# Patient Record
Sex: FEMALE | Race: WHITE | Employment: OTHER | URBAN - METROPOLITAN AREA
[De-identification: names, ages, dates, MRNs, and addresses within clinical notes are randomized per-mention and may not be internally consistent; named-entity substitution may affect disease eponyms.]

---

## 2017-06-20 ENCOUNTER — OFFICE VISIT (OUTPATIENT)
Dept: ORTHOPEDIC SURGERY | Age: 72
End: 2017-06-20

## 2017-06-20 ENCOUNTER — HOSPITAL ENCOUNTER (OUTPATIENT)
Dept: PHYSICAL THERAPY | Age: 72
Discharge: HOME | End: 2017-06-30
Admitting: ORTHOPAEDIC SURGERY

## 2017-06-20 VITALS
WEIGHT: 145 LBS | HEIGHT: 64 IN | DIASTOLIC BLOOD PRESSURE: 82 MMHG | SYSTOLIC BLOOD PRESSURE: 126 MMHG | BODY MASS INDEX: 24.75 KG/M2

## 2017-06-20 DIAGNOSIS — M25.562 ACUTE PAIN OF LEFT KNEE: Primary | ICD-10-CM

## 2017-06-20 DIAGNOSIS — M17.10 PATELLOFEMORAL ARTHRITIS: Chronic | ICD-10-CM

## 2017-06-20 PROCEDURE — 3014F SCREEN MAMMO DOC REV: CPT | Performed by: ORTHOPAEDIC SURGERY

## 2017-06-20 PROCEDURE — 1090F PRES/ABSN URINE INCON ASSESS: CPT | Performed by: ORTHOPAEDIC SURGERY

## 2017-06-20 PROCEDURE — 1123F ACP DISCUSS/DSCN MKR DOCD: CPT | Performed by: ORTHOPAEDIC SURGERY

## 2017-06-20 PROCEDURE — 4040F PNEUMOC VAC/ADMIN/RCVD: CPT | Performed by: ORTHOPAEDIC SURGERY

## 2017-06-20 PROCEDURE — 20610 DRAIN/INJ JOINT/BURSA W/O US: CPT | Performed by: ORTHOPAEDIC SURGERY

## 2017-06-20 PROCEDURE — G8420 CALC BMI NORM PARAMETERS: HCPCS | Performed by: ORTHOPAEDIC SURGERY

## 2017-06-20 PROCEDURE — 73564 X-RAY EXAM KNEE 4 OR MORE: CPT | Performed by: ORTHOPAEDIC SURGERY

## 2017-06-20 PROCEDURE — 3017F COLORECTAL CA SCREEN DOC REV: CPT | Performed by: ORTHOPAEDIC SURGERY

## 2017-06-20 PROCEDURE — G8427 DOCREV CUR MEDS BY ELIG CLIN: HCPCS | Performed by: ORTHOPAEDIC SURGERY

## 2017-06-20 PROCEDURE — 99214 OFFICE O/P EST MOD 30 MIN: CPT | Performed by: ORTHOPAEDIC SURGERY

## 2017-06-20 PROCEDURE — G8400 PT W/DXA NO RESULTS DOC: HCPCS | Performed by: ORTHOPAEDIC SURGERY

## 2017-06-20 PROCEDURE — 1036F TOBACCO NON-USER: CPT | Performed by: ORTHOPAEDIC SURGERY

## 2017-06-20 RX ORDER — MELOXICAM 15 MG/1
15 TABLET ORAL DAILY
Qty: 60 TABLET | Refills: 3 | Status: SHIPPED | OUTPATIENT
Start: 2017-06-20

## 2017-08-08 ENCOUNTER — OFFICE VISIT (OUTPATIENT)
Dept: ORTHOPEDIC SURGERY | Age: 72
End: 2017-08-08

## 2017-08-08 VITALS — WEIGHT: 145.06 LBS | HEIGHT: 64 IN | BODY MASS INDEX: 24.77 KG/M2

## 2017-08-08 DIAGNOSIS — M17.10 PATELLOFEMORAL ARTHRITIS: Primary | Chronic | ICD-10-CM

## 2017-08-08 PROCEDURE — 1090F PRES/ABSN URINE INCON ASSESS: CPT | Performed by: ORTHOPAEDIC SURGERY

## 2017-08-08 PROCEDURE — 1123F ACP DISCUSS/DSCN MKR DOCD: CPT | Performed by: ORTHOPAEDIC SURGERY

## 2017-08-08 PROCEDURE — G8420 CALC BMI NORM PARAMETERS: HCPCS | Performed by: ORTHOPAEDIC SURGERY

## 2017-08-08 PROCEDURE — G8400 PT W/DXA NO RESULTS DOC: HCPCS | Performed by: ORTHOPAEDIC SURGERY

## 2017-08-08 PROCEDURE — 99212 OFFICE O/P EST SF 10 MIN: CPT | Performed by: ORTHOPAEDIC SURGERY

## 2017-08-08 PROCEDURE — 4040F PNEUMOC VAC/ADMIN/RCVD: CPT | Performed by: ORTHOPAEDIC SURGERY

## 2017-08-08 PROCEDURE — 3017F COLORECTAL CA SCREEN DOC REV: CPT | Performed by: ORTHOPAEDIC SURGERY

## 2017-08-08 PROCEDURE — 1036F TOBACCO NON-USER: CPT | Performed by: ORTHOPAEDIC SURGERY

## 2017-08-08 PROCEDURE — 3014F SCREEN MAMMO DOC REV: CPT | Performed by: ORTHOPAEDIC SURGERY

## 2017-08-08 PROCEDURE — G8427 DOCREV CUR MEDS BY ELIG CLIN: HCPCS | Performed by: ORTHOPAEDIC SURGERY

## 2018-08-27 ENCOUNTER — OFFICE VISIT (OUTPATIENT)
Dept: ORTHOPEDIC SURGERY | Age: 73
End: 2018-08-27

## 2018-08-27 VITALS — HEIGHT: 64 IN | BODY MASS INDEX: 24.75 KG/M2 | WEIGHT: 145 LBS

## 2018-08-27 DIAGNOSIS — R52 PAIN: Primary | ICD-10-CM

## 2018-08-27 DIAGNOSIS — M17.10 PATELLOFEMORAL ARTHRITIS: Chronic | ICD-10-CM

## 2018-08-27 DIAGNOSIS — M25.562 CHRONIC PAIN OF LEFT KNEE: Chronic | ICD-10-CM

## 2018-08-27 DIAGNOSIS — G89.29 CHRONIC PAIN OF LEFT KNEE: Chronic | ICD-10-CM

## 2018-08-27 DIAGNOSIS — M25.561 RIGHT KNEE PAIN, UNSPECIFIED CHRONICITY: ICD-10-CM

## 2018-08-27 DIAGNOSIS — S83.241A ACUTE MEDIAL MENISCUS TEAR OF RIGHT KNEE, INITIAL ENCOUNTER: ICD-10-CM

## 2018-08-27 PROCEDURE — 20610 DRAIN/INJ JOINT/BURSA W/O US: CPT | Performed by: ORTHOPAEDIC SURGERY

## 2018-08-27 PROCEDURE — 1101F PT FALLS ASSESS-DOCD LE1/YR: CPT | Performed by: ORTHOPAEDIC SURGERY

## 2018-08-27 PROCEDURE — G8400 PT W/DXA NO RESULTS DOC: HCPCS | Performed by: ORTHOPAEDIC SURGERY

## 2018-08-27 PROCEDURE — 1090F PRES/ABSN URINE INCON ASSESS: CPT | Performed by: ORTHOPAEDIC SURGERY

## 2018-08-27 PROCEDURE — 4040F PNEUMOC VAC/ADMIN/RCVD: CPT | Performed by: ORTHOPAEDIC SURGERY

## 2018-08-27 PROCEDURE — 99213 OFFICE O/P EST LOW 20 MIN: CPT | Performed by: ORTHOPAEDIC SURGERY

## 2018-08-27 PROCEDURE — 1123F ACP DISCUSS/DSCN MKR DOCD: CPT | Performed by: ORTHOPAEDIC SURGERY

## 2018-08-27 PROCEDURE — 1036F TOBACCO NON-USER: CPT | Performed by: ORTHOPAEDIC SURGERY

## 2018-08-27 PROCEDURE — G8428 CUR MEDS NOT DOCUMENT: HCPCS | Performed by: ORTHOPAEDIC SURGERY

## 2018-08-27 PROCEDURE — 3017F COLORECTAL CA SCREEN DOC REV: CPT | Performed by: ORTHOPAEDIC SURGERY

## 2018-08-27 PROCEDURE — G8420 CALC BMI NORM PARAMETERS: HCPCS | Performed by: ORTHOPAEDIC SURGERY

## 2018-08-27 RX ORDER — MELOXICAM 15 MG/1
15 TABLET ORAL DAILY
Qty: 30 TABLET | Refills: 3 | Status: SHIPPED | OUTPATIENT
Start: 2018-08-27

## 2018-08-27 NOTE — PROGRESS NOTES
Site: RIGHT KNEE    Betamethazone  Lot number: [id number]  NDC#  7333-1466-52([facility])    GENE  NDC# 6240-9915-74  Lot number: [id number]

## 2018-08-27 NOTE — PROGRESS NOTES
Chief Complaint    Knee Pain (R KNEE PAIN - NKI )      History of Present Illness:  Annabelle Magaña is a 68 y.o. female. She has been having anterior and medial right knee pain for the last several months. There was no specific history of trauma. The pain sometimes can be sore and achy but also can occasionally be sharp and catching. Walking and stairs and standing increases her pain. The pain is dull and aching but as I said before sometimes catching as well. Twisting the knee aggravates the knee as well. She's been having difficulty bending her knee. She states his been difficult to drive. He describes instability as well. Pain is anterior and medial.  Pain level is 6/10. Rest and ice decreases the pain. Pain Assessment  Location of Pain: Knee  Location Modifiers: Right  Severity of Pain: 6  Quality of Pain: Dull, Aching  Duration of Pain: Persistent  Frequency of Pain: Constant  Aggravating Factors: Walking, Stairs, Standing  Limiting Behavior: Some  Relieving Factors: Ice  Result of Injury: No  Work-Related Injury: No  Are there other pain locations you wish to document?: No    Medical History:  Patient's medications, allergies, past medical, surgical, social and family histories were reviewed and updated as appropriate. Review of Systems:  Pertinent items are noted in HPI  Review of systems reviewed from Patient History Form dated on August 27, 2018 and available in the patient's chart under the Media tab. Vital Signs:  Ht 5' 4\" (1.626 m)   Wt 145 lb (65.8 kg)   BMI 24.89 kg/m²     General Exam:   Constitutional: Patient is adequately groomed with no evidence of malnutrition  Mental Status: The patient is oriented to time, place and person. The patient's mood and affect are appropriate. Lymphatic: The lymphatic examination bilaterally reveals all areas to be without enlargement or induration.     Knee Examination:    Inspection:  Trace effusion right knee    Palpation:  Grade 2+

## 2018-08-29 ENCOUNTER — HOSPITAL ENCOUNTER (OUTPATIENT)
Dept: PHYSICAL THERAPY | Age: 73
Setting detail: THERAPIES SERIES
Discharge: HOME | End: 2018-08-29
Payer: MEDICARE

## 2018-08-29 PROCEDURE — 97161 PT EVAL LOW COMPLEX 20 MIN: CPT

## 2018-08-29 PROCEDURE — 97110 THERAPEUTIC EXERCISES: CPT

## 2018-08-29 PROCEDURE — G8978 MOBILITY CURRENT STATUS: HCPCS

## 2018-08-29 PROCEDURE — G8979 MOBILITY GOAL STATUS: HCPCS

## 2018-08-29 NOTE — FLOWSHEET NOTE
[facility],  [address]  Phone: [phone]  Fax [phone]    Physical Therapy Daily Treatment Note  Date:  2018    Patient Name:  Edwin Champagne    :  [birth year]  MRN: [id number]  Restrictions/Precautions:    Medical/Treatment Diagnosis Information:  Diagnosis: M25.561 (ICD-10-CM) - Right knee pain, unspecified chronicity  Treatment Diagnosis: R knee pain X87.530,   Insurance/Certification information:  PT Insurance Information:  W [employer] Gibson  Physician Information:  Referring Practitioner: Dr Charley Miranda of care signed (Y/N):     Date of Patient follow up with Physician:  18    G-Code (if applicable):      Date G-Code Applied:    PT G-Codes  Functional Assessment Tool Used: LEFS  Score: 28%  Functional Limitation: Mobility: Walking and moving around  Mobility: Walking and Moving Around Current Status (): At least 20 percent but less than 40 percent impaired, limited or restricted  Mobility: Walking and Moving Around Goal Status ():  At least 1 percent but less than 20 percent impaired, limited or restricted    Progress Note: [x]  Yes  []  No  Next due by: Visit #10       Latex Allergy:  [x]NO      []YES  Preferred Language for Healthcare:   [x]English       []other:    Visit # Insurance Allowable   1 MC     Pain level: 4-7/10     SUBJECTIVE:  See eval    OBJECTIVE: See eval  Observation:   Test measurements:      RESTRICTIONS/PRECAUTIONS: Knee PF OA, Osteopenia     Exercises/Interventions:     Therapeutic Ex Sets/reps Notes   ITB S Supine 30 sec x 3 HEP   Prone quad S with strap 30 sec x 3 HEP   Abd SLR 3 x 10 HEP             SLR FLX supine 3x10 HEP   Functional Chair Squats 3 x 10 HEP                                                     Manual Intervention                                   NMR re-education                                                      Therapeutic Exercise and NMR EXR  [x] by pain  [] Patient limited by other medical complications  [] Other:     Prognosis: [x] Good [] Fair  [] Poor          Patient Requires Follow-up: [] Yes  [] No    PLAN: Follow up 1x per wk to progress and update HEP       [] Continue per plan of care [] Alter current plan (see comments)  [x] Plan of care initiated [] Discharge     Electronically signed by: Cami Sharma, PT

## 2018-08-29 NOTE — PLAN OF CARE
being addressed at this time. Co-morbidities/Complexities (which will affect course of rehabilitation):   []None           Arthritic conditions   []Rheumatoid arthritis (M05.9)  [x]Osteoarthritis (M19.91)   Cardiovascular conditions   []Hypertension (I10)  []Hyperlipidemia (E78.5)  []Angina pectoris (I20)  []Atherosclerosis (I70)   Musculoskeletal conditions   []Disc pathology   []Congenital spine pathologies   []Prior surgical intervention  []Osteoporosis (M81.8)  [x]Osteopenia (M85.8)   Endocrine conditions   []Hypothyroid (E03.9)  []Hyperthyroid Gastrointestinal conditions   []Constipation (B32.68)   Metabolic conditions   []Morbid obesity (E66.01)  []Diabetes type 1(E10.65) or 2 (E11.65)   []Neuropathy (G60.9)     Pulmonary conditions   []Asthma (J45)  []Coughing   []COPD (J44.9)   Psychological Disorders  []Anxiety (F41.9)  []Depression (F32.9)   []Other:   []Other:          Barriers to/and or personal factors that will affect rehab potential:              []Age  []Sex              []Motivation/Lack of Motivation                        [x]Co-Morbidities              []Cognitive Function, education/learning barriers              []Environmental, home barriers              []profession/work barriers  []past PT/medical experience  []other:  Justification:     Falls Risk Assessment (30 days):   [x] Falls Risk assessed and no intervention required. [] Falls Risk assessed and Patient requires intervention due to being higher risk   TUG score (>12s at risk):     [] Falls education provided, including       G-Codes:  PT G-Codes  Functional Assessment Tool Used: LEFS  Score: 28%  Functional Limitation: Mobility: Walking and moving around  Mobility: Walking and Moving Around Current Status (): At least 20 percent but less than 40 percent impaired, limited or restricted  Mobility: Walking and Moving Around Goal Status ():  At least 1 percent but less than 20 percent impaired, limited or Potential:      [x]Excellent   []Good    []Fair   []Poor    Tolerance of evaluation/treatment:    [x]Excellent   []Good    []Fair   []Poor  Physical Therapy Evaluation Complexity Justification  [x] A history of present problem with:  [] no personal factors and/or comorbidities that impact the plan of care;  [x]1-2 personal factors and/or comorbidities that impact the plan of care  []3 personal factors and/or comorbidities that impact the plan of care  [x] An examination of body systems using standardized tests and measures addressing any of the following: body structures and functions (impairments), activity limitations, and/or participation restrictions;:  [x] a total of 1-2 or more elements   [] a total of 3 or more elements   [] a total of 4 or more elements   [x] A clinical presentation with:  [x] stable and/or uncomplicated characteristics   [] evolving clinical presentation with changing characteristics  [] unstable and unpredictable characteristics;   [x] Clinical decision making of [x] low, [] moderate, [] high complexity using standardized patient assessment instrument and/or measurable assessment of functional outcome. [x] EVAL (LOW) 35505 (typically 20 minutes face-to-face)  [] EVAL (MOD) 03577 (typically 30 minutes face-to-face)  [] EVAL (HIGH) 20099 (typically 45 minutes face-to-face)  [] RE-EVAL       PLAN:   Frequency/Duration:  1 days per week for 4 Weeks:  Interventions:  [x]  Therapeutic exercise including: strength training, ROM, for Lower extremity and core   [x]  NMR activation and proprioception for LE, Glutes and Core   [x]  Manual therapy as indicated for LE, Hip and spine to include: Dry Needling/IASTM, STM, PROM, Gr I-IV mobilizations, manipulation. [x] Modalities as needed that may include: thermal agents, E-stim, Biofeedback, US, iontophoresis as indicated  [x] Patient education on joint protection, postural re-education, activity modification, progression of HEP.     HEP instruction:

## 2018-09-05 ENCOUNTER — HOSPITAL ENCOUNTER (OUTPATIENT)
Dept: PHYSICAL THERAPY | Age: 73
Setting detail: THERAPIES SERIES
Discharge: HOME | End: 2018-09-05
Payer: MEDICARE

## 2018-09-05 PROCEDURE — 97140 MANUAL THERAPY 1/> REGIONS: CPT | Performed by: PHYSICAL THERAPIST

## 2018-09-05 PROCEDURE — 97110 THERAPEUTIC EXERCISES: CPT | Performed by: PHYSICAL THERAPIST

## 2018-09-05 NOTE — FLOWSHEET NOTE
[facility],  [address]  Phone: [phone]  Fax [phone]    Physical Therapy Daily Treatment Note  Date:  2018    Patient Name:  Kelsey Kelley    :  1945  MRN: [id number]  Restrictions/Precautions:    Medical/Treatment Diagnosis Information:  Diagnosis: M25.561 (ICD-10-CM) - Right knee pain, unspecified chronicity  Treatment Diagnosis: R knee pain N79.922,   Insurance/Certification information:  PT Insurance Information: [facility]  Physician Information:  Referring Practitioner: Dr Ariane Coleman of care signed (Y/N):     Date of Patient follow up with Physician:  18    G-Code (if applicable):      Date G-Code Applied:    PT G-Codes  Functional Assessment Tool Used: LEFS  Score: 28%  Functional Limitation: Mobility: Walking and moving around  Mobility: Walking and Moving Around Current Status (): At least 20 percent but less than 40 percent impaired, limited or restricted  Mobility: Walking and Moving Around Goal Status (): At least 1 percent but less than 20 percent impaired, limited or restricted    Progress Note: []  Yes  []  No  Next due by: Visit #10       Latex Allergy:  [x]NO      []YES  Preferred Language for Healthcare:   [x]English       []other:    Visit # Insurance Allowable   2 MC     Pain level: 0-1/10     SUBJECTIVE:  No new complaints from last session, states she had a good holiday weekend socializing with friends. Only complaint in \"tightness\" and swelling in/posterior to the knee. Cortisone injection has helped a lot.     OBJECTIVE:   Observation:   Test measurements:      RESTRICTIONS/PRECAUTIONS: Knee PF OA, Osteopenia     Exercises/Interventions:     Therapeutic Ex Sets/reps Notes   ITB S Supine HEP   Prone quad S with strap HEP   Abd SLR W/ 2# 3x10 HEP   SAQ with add squeeze W/ 2# 3x10    SLR FLX supine   W/ 2# 3x10    HEP   Abdominal bracing  Bridging with Adductor squeeze and abdominal bracing 10\"x10  x10 w/ 2\" hold HEP  HEP                  Functional Chair Squats  HEP   Slantboard gastroc stretch 30\"x2 HEP                                           Pt edu: activity restrictions/modifications, anatomy, pathology, reviewed/added onto HEP 8'    Manual Intervention     hamstring stretch with hold/relax, IT band stretch, quad stretch, patellar mobs 4-way, knee flexion mob gr III with oscillations, STM hamstrings/gastroc/anterior tibialis 15'                             NMR re-education                                                      Therapeutic Exercise and NMR EXR  [x] (34958) Provided verbal/tactile cueing for activities related to strengthening, flexibility, endurance, ROM for improvements in LE, proximal hip, and core control with self care, mobility, lifting, ambulation.  [] (03767) Provided verbal/tactile cueing for activities related to improving balance, coordination, kinesthetic sense, posture, motor skill, proprioception  to assist with LE, proximal hip, and core control in self care, mobility, lifting, ambulation and eccentric single leg control.      NMR and Therapeutic Activities:    [] (27651 or 96559) Provided verbal/tactile cueing for activities related to improving balance, coordination, kinesthetic sense, posture, motor skill, proprioception and motor activation to allow for proper function of core, proximal hip and LE with self care and ADLs  [] (54008) Gait Re-education- Provided training and instruction to the patient for proper LE, core and proximal hip recruitment and positioning and eccentric body weight control with ambulation re-education including up and down stairs     Home Exercise Program:    [x] (91483) Reviewed/Progressed HEP activities related to strengthening, flexibility, endurance, ROM of core, proximal hip and LE for functional self-care, mobility, lifting and ambulation/stair navigation   [] (41869)Reviewed/Progressed HEP activities related to improving balance, coordination, kinesthetic sense, posture, motor skill, proprioception of core, proximal hip and LE for self care, mobility, lifting, and ambulation/stair navigation      Manual Treatments:  PROM / STM / Oscillations-Mobs:  G-I, II, III, IV (PA's, Inf., Post.)  [x] (69573) Provided manual therapy to mobilize LE, proximal hip and/or LS spine soft tissue/joints for the purpose of modulating pain, promoting relaxation,  increasing ROM, reducing/eliminating soft tissue swelling/inflammation/restriction, improving soft tissue extensibility and allowing for proper ROM for normal function with self care, mobility, lifting and ambulation. Modalities:   Pt declined CP 9/5/18    Charges:  Timed Code Treatment Minutes: 45   Total Treatment Minutes: 45     [] EVAL (LOW) 29332 (typically 20 minutes face-to-face)  [] EVAL (MOD) 52636 (typically 30 minutes face-to-face)  [] EVAL (HIGH) 65469 (typically 45 minutes face-to-face)  [] RE-EVAL     [x] JG(95681) x  2   [] IONTO  [] NMR (40363) x      [] VASO  [x] Manual (48983) x  1    [] Other:  [] TA x       [] Mech Traction (45823)  [] ES(attended) (59534)      [] ES (un) (41646): Anthony Sigala stated goal: walking and climbing stairs painfree    Therapist goals for Patient:   Short Term Goals: To be achieved in: 2 weeks  1. Independent in HEP and progression per patient tolerance, in order to prevent re-injury. 2. Patient will have a decrease in pain to facilitate improvement in movement, function, and ADLs as indicated by Functional Deficits. Long Term Goals: To be achieved in: 4 weeks  1. Disability index score of 14% or less for the LEFS to assist with reaching prior level of function. 2. Patient will demonstrate an increase in Strength to good proximal hip strength and control, within 5lb HHD in LE to allow for proper functional mobility as indicated by patients Functional Deficits.    3. Patient will return to walking, sleeping, transfers, self care, driving and stairs functional activities without increased symptoms or restriction. 4 Walking and climbing stairs painfree (patient specific functional goal)     Progression Towards Functional goals:  [x] Patient is progressing as expected towards functional goals listed. [] Progression is slowed due to complexities listed. [] Progression has been slowed due to co-morbidities. [] Plan just implemented, too soon to assess goals progression  [] Other:     ASSESSMENT:  Added SAQ with 2# weight, added weight to HEP SLRs without incr pain. Pt expressed mild fatigue following SLR in abduction. Performed gr III mob to incr knee flexion, pt reported decr pain with deep squatting following mob. Pt active with yoga and pilates, highly motivated to return to pain-free recreational activities. Making good progress, continue to incr resistance exercises as tolerated. Pt most limited by mild swelling on the posterior-medial aspect of the knee. Treatment/Activity Tolerance:  [x] Patient tolerated treatment well [] Patient limited by fatique  [] Patient limited by pain  [] Patient limited by other medical complications  [] Other:     Prognosis: [x] Good [] Fair  [] Poor          Patient Requires Follow-up: [] Yes  [] No    PLAN: Follow up 1x per wk to progress and update HEP       [x] Continue per plan of care [] Alter current plan (see comments)  [] Plan of care initiated [] Discharge     Electronically signed by: Ramses Verduzco, PT    Richard Collazo, [facility] Therapist was present, directed the patient's care, made skilled judgement, and was responsible for assessment and treatment of the patient.

## 2018-09-12 ENCOUNTER — HOSPITAL ENCOUNTER (OUTPATIENT)
Dept: PHYSICAL THERAPY | Age: 73
Setting detail: THERAPIES SERIES
Discharge: HOME | End: 2018-09-12
Payer: MEDICARE

## 2018-09-12 PROCEDURE — 97140 MANUAL THERAPY 1/> REGIONS: CPT

## 2018-09-12 PROCEDURE — 97110 THERAPEUTIC EXERCISES: CPT

## 2018-09-12 NOTE — FLOWSHEET NOTE
navigation   [] (61273)Reviewed/Progressed HEP activities related to improving balance, coordination, kinesthetic sense, posture, motor skill, proprioception of core, proximal hip and LE for self care, mobility, lifting, and ambulation/stair navigation      Manual Treatments:  PROM / STM / Oscillations-Mobs:  G-I, II, III, IV (PA's, Inf., Post.)  [x] (45544) Provided manual therapy to mobilize LE, proximal hip and/or LS spine soft tissue/joints for the purpose of modulating pain, promoting relaxation,  increasing ROM, reducing/eliminating soft tissue swelling/inflammation/restriction, improving soft tissue extensibility and allowing for proper ROM for normal function with self care, mobility, lifting and ambulation. Modalities:   Pt declined CP 9/5/18    Charges:  Timed Code Treatment Minutes: 45   Total Treatment Minutes: 45     [] EVAL (LOW) 61046 (typically 20 minutes face-to-face)  [] EVAL (MOD) 49185 (typically 30 minutes face-to-face)  [] EVAL (HIGH) 24119 (typically 45 minutes face-to-face)  [] RE-EVAL     [x] UJ(68396) x  2   [] IONTO  [] NMR (31115) x      [] VASO  [x] Manual (23831) x  1    [] Other:  [] TA x       [] Mech Traction (30920)  [] ES(attended) (16692)      [] ES (un) (58100): Tee Pyle stated goal: walking and climbing stairs painfree    Therapist goals for Patient:   Short Term Goals: To be achieved in: 2 weeks  1. Independent in HEP and progression per patient tolerance, in order to prevent re-injury. 2. Patient will have a decrease in pain to facilitate improvement in movement, function, and ADLs as indicated by Functional Deficits. Long Term Goals: To be achieved in: 4 weeks  1. Disability index score of 14% or less for the LEFS to assist with reaching prior level of function.     2. Patient will demonstrate an increase in Strength to good proximal hip strength and control, within 5lb HHD in LE to allow for proper functional mobility as indicated by patients Functional

## 2018-09-17 ENCOUNTER — OFFICE VISIT (OUTPATIENT)
Dept: ORTHOPEDIC SURGERY | Age: 73
End: 2018-09-17

## 2018-09-17 DIAGNOSIS — M17.10 PATELLOFEMORAL ARTHRITIS: Primary | Chronic | ICD-10-CM

## 2018-09-17 PROCEDURE — 1090F PRES/ABSN URINE INCON ASSESS: CPT | Performed by: ORTHOPAEDIC SURGERY

## 2018-09-17 PROCEDURE — G8428 CUR MEDS NOT DOCUMENT: HCPCS | Performed by: ORTHOPAEDIC SURGERY

## 2018-09-17 PROCEDURE — G8420 CALC BMI NORM PARAMETERS: HCPCS | Performed by: ORTHOPAEDIC SURGERY

## 2018-09-17 PROCEDURE — 1123F ACP DISCUSS/DSCN MKR DOCD: CPT | Performed by: ORTHOPAEDIC SURGERY

## 2018-09-17 PROCEDURE — 4040F PNEUMOC VAC/ADMIN/RCVD: CPT | Performed by: ORTHOPAEDIC SURGERY

## 2018-09-17 PROCEDURE — 1101F PT FALLS ASSESS-DOCD LE1/YR: CPT | Performed by: ORTHOPAEDIC SURGERY

## 2018-09-17 PROCEDURE — 1036F TOBACCO NON-USER: CPT | Performed by: ORTHOPAEDIC SURGERY

## 2018-09-17 PROCEDURE — G8400 PT W/DXA NO RESULTS DOC: HCPCS | Performed by: ORTHOPAEDIC SURGERY

## 2018-09-17 PROCEDURE — 3017F COLORECTAL CA SCREEN DOC REV: CPT | Performed by: ORTHOPAEDIC SURGERY

## 2018-09-17 PROCEDURE — 99212 OFFICE O/P EST SF 10 MIN: CPT | Performed by: ORTHOPAEDIC SURGERY

## 2018-09-17 NOTE — PROGRESS NOTES
Good ligamentous stability. No tenderness. Negative Miryam sign. 0-130° range of motion. The injection of cortisone followed by the physical therapy has a very positive effect for her right knee. She offers no complaints of pain. There is no locking, catching or instability. She is sleeping well through the night. She had trouble taking meloxicam and stopped taking that when it began bothering her stomach. She just uses over-the-counter anti-inflammatory medications as needed at this time. Review of systems from August 27, 2018 basically unchanged. Physical exam of the knee shows no effusion. ligamentous stability is good. No tenderness. Negative Miryam sign. 0-125° range of motion. We talked about the need for continued strengthening. She's can wean out of therapy over the next couple of weeks. We talked about the possibility of viscous supplementation. We also talked about the possible need for an MRI scan of her symptoms were mechanical.  At this point we will plan on seeing her back on an as-needed basis. I spent 10+ minutes with the patient including 5+ minutes face to face with the patient discussing and answering questions regarding their patellofemoral arthritis with outside chance of internal derangement right knee.

## 2018-09-19 ENCOUNTER — HOSPITAL ENCOUNTER (OUTPATIENT)
Dept: PHYSICAL THERAPY | Age: 73
Setting detail: THERAPIES SERIES
Discharge: HOME | End: 2018-09-19
Payer: MEDICARE

## 2018-09-19 PROCEDURE — 97110 THERAPEUTIC EXERCISES: CPT

## 2018-09-19 PROCEDURE — 97140 MANUAL THERAPY 1/> REGIONS: CPT

## 2018-09-19 NOTE — FLOWSHEET NOTE
SLR FLX supine   W/ 2# 3x10    HEP   Abdominal bracing  Bridging with Adductor squeeze and abdominal bracing 10\"x10  x10 w/ 2\" hold HEP  HEP   SB Bridging with B knees extended 3 sec/ 2 x 10    SB Bridging with L LE SLR lift 2 x 10                        SB pass from LE\"s to UE's w/ abdominal bracing 2 x 10    Prostep S Gastroc 20 sec x 3    Functional Chair Squats  HEP   Slantboard gastroc stretch 30\"x2 HEP   Disc R stance leg/L hip abd/ext  3 x 10    Std ITB S at the table 30 sec x 3 Add to HEP   LSD 4 in 3 x 10                             Pt edu: activity restrictions/modifications, anatomy, pathology, reviewed/added onto HEP 8'    Manual Intervention     hamstring stretch with hold/relax, IT band stretch, quad stretch, patellar mobs 4-way, knee flexion mob gr III with oscillations,  15'                             NMR re-education                                                      Therapeutic Exercise and NMR EXR  [x] (37807) Provided verbal/tactile cueing for activities related to strengthening, flexibility, endurance, ROM for improvements in LE, proximal hip, and core control with self care, mobility, lifting, ambulation.  [] (99682) Provided verbal/tactile cueing for activities related to improving balance, coordination, kinesthetic sense, posture, motor skill, proprioception  to assist with LE, proximal hip, and core control in self care, mobility, lifting, ambulation and eccentric single leg control.      NMR and Therapeutic Activities:    [] (01119 or 24612) Provided verbal/tactile cueing for activities related to improving balance, coordination, kinesthetic sense, posture, motor skill, proprioception and motor activation to allow for proper function of core, proximal hip and LE with self care and ADLs  [] (64240) Gait Re-education- Provided training and instruction to the patient for proper LE, core and proximal hip recruitment and positioning and eccentric body weight control with ambulation

## 2018-09-28 ENCOUNTER — HOSPITAL ENCOUNTER (OUTPATIENT)
Dept: PHYSICAL THERAPY | Age: 73
Setting detail: THERAPIES SERIES
Discharge: HOME | End: 2018-09-28
Payer: MEDICARE

## 2018-09-28 PROCEDURE — 97140 MANUAL THERAPY 1/> REGIONS: CPT

## 2018-09-28 PROCEDURE — 97110 THERAPEUTIC EXERCISES: CPT

## 2018-09-28 NOTE — FLOWSHEET NOTE
[facility], [address] [place] [address]  Phone: [phone]  Fax [phone]    Physical Therapy Daily Treatment Note  Date:  2018    Patient Name:  Jesus Shetty    :  [id number]  MRN: [id number]  Restrictions/Precautions:    Medical/Treatment Diagnosis Information:  Diagnosis: M25.561 (ICD-10-CM) - Right knee pain, unspecified chronicity  Treatment Diagnosis: R knee pain B95.872,   Insurance/Certification information:  PT Insurance Information: [facility]  Physician Information:  Referring Practitioner: Dr Clayton Barfield of care signed (Y/N):     Date of Patient follow up with Physician:  18    G-Code (if applicable):      Date G-Code Applied:    PT G-Codes  Functional Assessment Tool Used: LEFS  Score: 28%  Functional Limitation: Mobility: Walking and moving around  Mobility: Walking and Moving Around Current Status (): At least 20 percent but less than 40 percent impaired, limited or restricted  Mobility: Walking and Moving Around Goal Status (): At least 1 percent but less than 20 percent impaired, limited or restricted    Progress Note: []  Yes  [x]  No  Next due by: Visit #10       Latex Allergy:  [x]NO      []YES  Preferred Language for Healthcare:   [x]English       []other:    Visit # Insurance Allowable   5 [facility]     Pain level: 0-1/10     SUBJECTIVE: Patient reports that she is limping a lot when she first gets out of bed. SHe feels a fullness behind the knee that limits her motion during Yoga.   OBJECTIVE:   Observation:   Test measurements:  140 degrees R knee flexion, L knee flexion 146  L Normal Quad length/R Min Quad tightness  RESTRICTIONS/PRECAUTIONS: Knee PF OA, Osteopenia     Exercises/Interventions:     Therapeutic Ex Sets/reps Notes   ITB S Supine HEP   Prone quad S with strap HEP   Abd SLR W/ 2# 3x10 HEP   SAQ with add squeeze W/ 2# 3x10    SLR FLX supine   W/ 2# 3x10    HEP   Abdominal bracing  Bridging with Adductor squeeze and abdominal bracing 10\"x10  x10 w/ 2\" hold HEP  HEP   SB Bridging with B knees extended 3 sec/ 2 x 10    SB Bridging with L LE SLR lift 2 x 10    Prone B HS curl 4# 3 x 10                   SB pass from LE\"s to UE's w/ abdominal bracing 2 x 10    Prostep S Gastroc 20 sec x 3    Functional Chair Squats  HEP   Slantboard gastroc stretch 30\"x2 HEP   Disc R stance leg/L hip abd/ext  3 x 10    Std ITB S at the table 30 sec x 3 Add to HEP   LSD 4 in 3 x 10                             Pt edu: activity restrictions/modifications, anatomy, pathology, reviewed/added onto HEP 8'    Manual Intervention     hamstring stretch with hold/relax, IT band stretch, quad stretch, patellar mobs 4-way, knee flexion mob gr III with oscillations, STM to med and lateral hamstringsgastroc 20'                             NMR re-education                                                      Therapeutic Exercise and NMR EXR  [x] (42014) Provided verbal/tactile cueing for activities related to strengthening, flexibility, endurance, ROM for improvements in LE, proximal hip, and core control with self care, mobility, lifting, ambulation.  [] (71835) Provided verbal/tactile cueing for activities related to improving balance, coordination, kinesthetic sense, posture, motor skill, proprioception  to assist with LE, proximal hip, and core control in self care, mobility, lifting, ambulation and eccentric single leg control.      NMR and Therapeutic Activities:    [] (39359 or 96384) Provided verbal/tactile cueing for activities related to improving balance, coordination, kinesthetic sense, posture, motor skill, proprioception and motor activation to allow for proper function of core, proximal hip and LE with self care and ADLs  [] (42733) Gait Re-education- Provided training and instruction to the patient for proper LE, core and proximal hip recruitment and positioning and eccentric body weight control with 14% or less for the LEFS to assist with reaching prior level of function. 2. Patient will demonstrate an increase in Strength to good proximal hip strength and control, within 5lb HHD in LE to allow for proper functional mobility as indicated by patients Functional Deficits. 3. Patient will return to walking, sleeping, transfers, self care, driving and stairs functional activities without increased symptoms or restriction. 4 Walking and climbing stairs painfree (patient specific functional goal)     Progression Towards Functional goals:  [x] Patient is progressing as expected towards functional goals listed. [] Progression is slowed due to complexities listed. [] Progression has been slowed due to co-morbidities. [] Plan just implemented, too soon to assess goals progression  [] Other:     ASSESSMENT: Less knee flexion R LE compared to last visit. Palpated some swelling popliteal fossa. Possible Baker's Cyst, however would need confirmation from MD. Discussed scheduling a follow up appointment with the MD. Need extension of POC date to with 10/12/18 in order to use all 8 visits as patient began with frequency of only 1 time per week. Treatment/Activity Tolerance:  [x] Patient tolerated treatment well [] Patient limited by fatique  [] Patient limited by pain  [] Patient limited by other medical complications  [] Other:     Prognosis: [x] Good [] Fair  [] Poor          Patient Requires Follow-up: [] Yes  [] No    PLAN:Will continue PT increasing to 2x per week.  POC thru 10/12/18       [x] Continue per plan of care [] Alter current plan (see comments)  [] Plan of care initiated [] Discharge     Electronically signed by: Clarice Oppenheim, PT

## 2018-09-28 NOTE — FLOWSHEET NOTE
[facility], [address] [place]  Phone: [phone]  Fax [phone]      ATHLETIC TRAINING [address]  Date:  2018    Patient Name:  Carmine Infante    :  [id number]  MRN: [id number]  Restrictions/Precautions:    Medical/Treatment Diagnosis Information:  ·  Dx: R knee pn, unspecified chronicity  ·  Tx Dx: R knee pn  Physician Information:   Dr. Silvia James  8 wks  12 wks 16 wks 20 wks   24 wks                            Activity Log                                                  DOS/DOI:                                                    Date:  18   ATC communication    Bike    Elliptical    Treadmill    Airdyne        Gastroc stretch    Soleus stretch    Hamstring stretch    ITB stretch    Hip Flexor stretch    Quad stretch    Adductor stretch        Weight Shifting sp                              fp                              tp    Lateral walking (with/w/o TB)        Balance: PEP/Kelsey board                   SLS          Star excursion load/explode          Extremity reach UE/LE        Leg Press Maulik. 60# 3x10                     Ecc.                      Inv. Calf Press Maulik. Ecc.                        Inv.        KEN   Flex               ABd               ADd              TKE               Ext        Steps Up               Up and Over               Down               Lateral 4\" x30              Rotation        Squats  mini                  wall                 BOSU         Lunges:  Lunge to Balance                   Balance to Lunge                   Walking        Knee Extension Bilat. Ecc.                               Inv. Hamstring Curls Bilat. 30# 3x10                              Ecc.                               Inv.        Soleus Press Bilat.                            Ecc.

## 2018-10-01 ENCOUNTER — HOSPITAL ENCOUNTER (OUTPATIENT)
Dept: PHYSICAL THERAPY | Age: 73
Setting detail: THERAPIES SERIES
Discharge: HOME | End: 2018-10-01
Payer: MEDICARE

## 2018-10-01 PROCEDURE — 97110 THERAPEUTIC EXERCISES: CPT | Performed by: PHYSICAL THERAPIST

## 2018-10-01 PROCEDURE — 97112 NEUROMUSCULAR REEDUCATION: CPT | Performed by: PHYSICAL THERAPIST

## 2018-10-01 PROCEDURE — 97140 MANUAL THERAPY 1/> REGIONS: CPT | Performed by: PHYSICAL THERAPIST

## 2018-10-01 NOTE — FLOWSHEET NOTE
Abd SLR  HEP   SAQ with add squeeze     SLR FLX supine       HEP   Abdominal bracing  Bridging with Adductor squeeze and abdominal bracing  HEP  HEP   SB Bridging with B knees extended     SB Bridging with L LE SLR lift  SB Bridge with HSC 2 x 10 R/L  2x10    Prone B HS curl 4#     Stool AAROM knee flexion  Stair AAROM knee flexion 2'  1'              SB pass from LE\"s to UE's w/ abdominal bracing     Prostep S Gastroc     Functional Chair Squats  HEP   Slantboard gastroc stretch 30\"x4 L2 HEP   Disc R stance leg/L hip abd/ext      Std ITB S at the table  Add to HEP   LSD 4 in 3 x 10                             Pt edu: activity restrictions/modifications, anatomy, pathology, reviewed/added onto HEP     Manual Intervention     hamstring stretch with hold/relax, IT band stretch, quad stretch, patellar mobs 4-way,, STM to med and lateral hamstringsgastroc, PROM flexion/ext R knee 20'                             NMR re-education     BOSU round up: step-up SLB 5\" x10 R/L Fingers on wall   BOSU flat up: mini squats x15 Hands on wall   Post reaches on step x10 R/L                                      Therapeutic Exercise and NMR EXR  [x] (95932) Provided verbal/tactile cueing for activities related to strengthening, flexibility, endurance, ROM for improvements in LE, proximal hip, and core control with self care, mobility, lifting, ambulation. [x] (34757) Provided verbal/tactile cueing for activities related to improving balance, coordination, kinesthetic sense, posture, motor skill, proprioception  to assist with LE, proximal hip, and core control in self care, mobility, lifting, ambulation and eccentric single leg control.      NMR and Therapeutic Activities:    [] (80549 or 60110) Provided verbal/tactile cueing for activities related to improving balance, coordination, kinesthetic sense, posture, motor skill, proprioception and motor activation to allow for proper function of core, proximal hip and LE with self care

## 2018-10-03 ENCOUNTER — HOSPITAL ENCOUNTER (OUTPATIENT)
Dept: PHYSICAL THERAPY | Age: 73
Setting detail: THERAPIES SERIES
Discharge: HOME | End: 2018-10-03
Payer: MEDICARE

## 2018-10-03 PROCEDURE — 97110 THERAPEUTIC EXERCISES: CPT | Performed by: PHYSICAL THERAPIST

## 2018-10-03 PROCEDURE — G8978 MOBILITY CURRENT STATUS: HCPCS | Performed by: PHYSICAL THERAPIST

## 2018-10-03 PROCEDURE — G8979 MOBILITY GOAL STATUS: HCPCS | Performed by: PHYSICAL THERAPIST

## 2018-10-03 PROCEDURE — 97140 MANUAL THERAPY 1/> REGIONS: CPT | Performed by: PHYSICAL THERAPIST

## 2018-10-03 PROCEDURE — G8980 MOBILITY D/C STATUS: HCPCS | Performed by: PHYSICAL THERAPIST

## 2018-10-03 PROCEDURE — 97112 NEUROMUSCULAR REEDUCATION: CPT | Performed by: PHYSICAL THERAPIST

## 2019-07-16 ENCOUNTER — OFFICE VISIT (OUTPATIENT)
Dept: ORTHOPEDIC SURGERY | Age: 74
End: 2019-07-16
Payer: MEDICARE

## 2019-07-16 ENCOUNTER — HOSPITAL ENCOUNTER (OUTPATIENT)
Dept: PHYSICAL THERAPY | Age: 74
Setting detail: THERAPIES SERIES
Discharge: HOME | End: 2019-07-16
Payer: MEDICARE

## 2019-07-16 VITALS — BODY MASS INDEX: 24.77 KG/M2 | HEIGHT: 64 IN | WEIGHT: 145.06 LBS

## 2019-07-16 DIAGNOSIS — M72.2 PLANTAR FASCIITIS OF LEFT FOOT: ICD-10-CM

## 2019-07-16 DIAGNOSIS — R52 PAIN: Primary | ICD-10-CM

## 2019-07-16 PROCEDURE — G8428 CUR MEDS NOT DOCUMENT: HCPCS | Performed by: ORTHOPAEDIC SURGERY

## 2019-07-16 PROCEDURE — 3017F COLORECTAL CA SCREEN DOC REV: CPT | Performed by: ORTHOPAEDIC SURGERY

## 2019-07-16 PROCEDURE — 97110 THERAPEUTIC EXERCISES: CPT

## 2019-07-16 PROCEDURE — 97112 NEUROMUSCULAR REEDUCATION: CPT

## 2019-07-16 PROCEDURE — 1036F TOBACCO NON-USER: CPT | Performed by: ORTHOPAEDIC SURGERY

## 2019-07-16 PROCEDURE — G8420 CALC BMI NORM PARAMETERS: HCPCS | Performed by: ORTHOPAEDIC SURGERY

## 2019-07-16 PROCEDURE — G8400 PT W/DXA NO RESULTS DOC: HCPCS | Performed by: ORTHOPAEDIC SURGERY

## 2019-07-16 PROCEDURE — 4040F PNEUMOC VAC/ADMIN/RCVD: CPT | Performed by: ORTHOPAEDIC SURGERY

## 2019-07-16 PROCEDURE — 1123F ACP DISCUSS/DSCN MKR DOCD: CPT | Performed by: ORTHOPAEDIC SURGERY

## 2019-07-16 PROCEDURE — 97161 PT EVAL LOW COMPLEX 20 MIN: CPT

## 2019-07-16 PROCEDURE — 1090F PRES/ABSN URINE INCON ASSESS: CPT | Performed by: ORTHOPAEDIC SURGERY

## 2019-07-16 PROCEDURE — 99213 OFFICE O/P EST LOW 20 MIN: CPT | Performed by: ORTHOPAEDIC SURGERY

## 2019-07-16 NOTE — PROGRESS NOTES
Chief Complaint    No chief complaint on file. History of Present Illness:  Jacky Opitz is a 69 y.o. femalehere for evaluation chief complaint of left heel pain. She states this started approximately a month ago. There is been no change in her activities although she is extremely active and works out almost daily doing low impact aerobics and also lifting weights. She states the pain comes and goes. She is never had a pain like this before. She obtained off-the-shelf inserts which seemed to help somewhat and is now doing water aerobics. Currently rates her pain at 7 out of 10. She is positive for step pain in the morning and the pain is on the plantar medial aspect of her left heel. Medical History:  Patient's medications, allergies, past medical, surgical, social and family histories were reviewed and updated as appropriate. Review of Systems:  Pertinent items are noted in HPI  Review of systems reviewed from Patient History Form dated on 7/16/2019 and available in the patient's chart under the Media tab. Vital Signs: There were no vitals taken for this visit. General Exam:   Constitutional: Patient is adequately groomed with no evidence of malnutrition  DTRs: Deep tendon reflexes are intact  Mental Status: The patient is oriented to time, place and person. The patient's mood and affect are appropriate. Lymphatic: The lymphatic examination bilaterally reveals all areas to be without enlargement or induration. Foot Examination:    Inspection: No significant swelling erythema or ecchymosis    Palpation: Minimal tenderness over the plantar medial aspect of the left heel    Range of Motion: Excellent flexibility    Strength: 5 throughout    Special Tests: Negative Tinel's to the tarsal tunnel    Skin: There are no rashes, ulcerations or lesions.     Gait: Antalgic    Reflex 2+ and symmetric    Additional Comments:       Additional Examinations:         Right Lower Extremity:

## 2019-07-16 NOTE — PLAN OF CARE
Medical History:  Left foot x-rays  (7-): Impression shows no evidence of osseous lesion at the heel    Functional Disability Index:LEFS= 34  %    Height: 5' 4\" Weight: 145 lb  Pain Scale: 4-6/10  Easing factors: rest  Provocative factors: am first standing, walking, stairs, squatting, exercise. Type: []Constant   [x]Intermittent  []Radiating []Localized []other:     Numbness/Tingling: none    Occupation/School: Retired from 43 years @ 70 [employer] and 6 years [employer]. Very active: walking, tennis, yoga, aerobics, wt training. Living Status/Prior Level of Function: Lives with spouse in 2 story home. / Independent with ADLs and IADLs. OBJECTIVE:     ROM LEFT RIGHT   HIP Flex     HIP Abd     HIP Ext 10 10   HIP IR     HIP ER 30 40   Knee ext     Knee Flex     Ankle PF 50 50   Ankle DF 16 21   Ankle In     Ankle Ev     Strength  LEFT RIGHT   HIP Flexors     HIP Abductors     HIP Ext     Hip ER     Knee EXT (quad)     Knee Flex (HS)     Ankle DF 5 5   Ankle PF 5 5   Ankle Inv 5 5   Ankle EV 5 5        Circumference  Mid apex  7 cm prox             Reflexes/Sensation:    [x]Dermatomes/Myotomes intact    [x]Reflexes equal and normal bilaterally   []Other:    Joint mobility:    [x]Normal    []Hypo   []Hyper    Palpation: TTP central portion of left plantar fascia @ calcaneous. Functional Mobility/Transfers: Hesitant to weight bear left LE with first standing. Posture: Pes cavus    Bandages/Dressings/Incisions: none    Gait: Antalgic. Decrease stance time & length left LE. Decrease Heel strike and toe-push-off left LE. Orthopedic Special Tests:                        [x] Patient history, allergies, meds reviewed. Medical chart reviewed. See intake form. Review Of Systems (ROS):  [x]Performed Review of systems (Integumentary, CardioPulmonary, Neurological) by intake and observation. Intake form has been scanned into medical record.  Patient has been instructed to contact their primary comorbidities that impact the plan of care  []3 personal factors and/or comorbidities that impact the plan of care  [x] An examination of body systems using standardized tests and measures addressing any of the following: body structures and functions (impairments), activity limitations, and/or participation restrictions;:  [x] a total of 1-2 or more elements   [] a total of 3 or more elements   [] a total of 4 or more elements   [x] A clinical presentation with:  [x] stable and/or uncomplicated characteristics   [] evolving clinical presentation with changing characteristics  [] unstable and unpredictable characteristics;   [x] Clinical decision making of [x] low, [] moderate, [] high complexity using standardized patient assessment instrument and/or measurable assessment of functional outcome. [x] EVAL (LOW) 35851 (typically 20 minutes face-to-face)  [] EVAL (MOD) 54817 (typically 30 minutes face-to-face)  [] EVAL (HIGH) 01499 (typically 45 minutes face-to-face)  [] RE-EVAL       PLAN:   Frequency/Duration:  2 days per week for 6-8 Weeks:  Interventions:  [x]  Therapeutic exercise including: strength training, ROM, for Lower extremity and core   [x]  NMR activation and proprioception for LE, Glutes and Core   [x]  Manual therapy as indicated for LE, Hip and spine to include: Dry Needling/IASTM, STM, PROM, Gr I-IV mobilizations, manipulation. [x] Modalities as needed that may include: thermal agents, E-stim, Biofeedback, US, iontophoresis as indicated  [x] Patient education on joint protection, postural re-education, activity modification, progression of HEP. HEP instruction: Patient given written HEP and TB (see scanned forms). GOALS:  Patient stated goal: Walk community distances in standard tennis shoe without left foot pain. Therapist goals for Patient:   Short Term Goals: To be achieved in: 2 weeks  1. Independent in HEP and progression per patient tolerance, in order to prevent re-injury.    2. Patient will have a decrease in pain to facilitate improvement in movement, function, and ADLs as indicated by Functional Deficits. Long Term Goals: To be achieved in: 6-8 weeks  1. Disability index score of  18% or less for the LEFS to assist with reaching prior level of function. 2. Patient will demonstrate increased AROM Left ankle to 20 deg DF to allow for proper joint functioning as indicated by patients Functional Deficits. 3. Patient will demonstrate an increase in Strength to good proximal hip strength and control, within 5lb HHD in LE to allow for proper functional mobility as indicated by patients Functional Deficits. 4. Patient will be able to rise to standing without increased symptoms or restriction left foot. 5. Patient will walk community distances in standard tennis shoe w/o increase in symptoms or restrictions.         Electronically signed by:  Catalina Lopez, [address]

## 2019-08-05 ENCOUNTER — HOSPITAL ENCOUNTER (OUTPATIENT)
Dept: PHYSICAL THERAPY | Age: 74
Setting detail: THERAPIES SERIES
Discharge: HOME | End: 2019-08-05
Payer: MEDICARE

## 2019-08-05 PROCEDURE — 97110 THERAPEUTIC EXERCISES: CPT

## 2019-08-05 PROCEDURE — 97140 MANUAL THERAPY 1/> REGIONS: CPT

## 2019-08-05 NOTE — FLOWSHEET NOTE
Manual Treatments:  PROM / STM / Oscillations-Mobs:  G-I, II, III, IV (PA's, Inf., Post.)  [] (44698) Provided manual therapy to mobilize LE, proximal hip and/or LS spine soft tissue/joints for the purpose of modulating pain, promoting relaxation,  increasing ROM, reducing/eliminating soft tissue swelling/inflammation/restriction, improving soft tissue extensibility and allowing for proper ROM for normal function with self care, mobility, lifting and ambulation. Modalities: CP left plantar foot x15 min     Charges:  Timed Code Treatment Minutes: 40   Total Treatment Minutes: 55     [] EVAL (LOW) 73721 (typically 20 minutes face-to-face)  [] EVAL (MOD) 30265 (typically 30 minutes face-to-face)  [] EVAL (HIGH) 17980 (typically 45 minutes face-to-face)  [] RE-EVAL     [x] UH(65595) x  2   [] IONTO  [] NMR (50511) x      [] VASO  [x] Manual (94872) x  1    [x] Other:CP  [] TA x       [] Mech Traction (66437)  [] ES(attended) (23998)      [] ES (un) (93754):     GOALS:   Short Term Goals: To be achieved in: 2 weeks  1. Independent in HEP and progression per patient tolerance, in order to prevent re-injury. Met  2. Patient will have a decrease in pain to facilitate improvement in movement, function, and ADLs as indicated by Functional Deficits. Met     Long Term Goals: To be achieved in: 6-8 weeks  1. Disability index score of  18% or less for the LEFS to assist with reaching prior level of function. 2. Patient will demonstrate increased AROM Left ankle to 20 deg DF to allow for proper joint functioning as indicated by patients Functional Deficits. 3. Patient will demonstrate an increase in Strength to good proximal hip strength and control, within 5lb HHD in LE to allow for proper functional mobility as indicated by patients Functional Deficits. 4. Patient will be able to rise to standing without increased symptoms or restriction left foot.   5. Patient will walk community distances in standard tennis shoe